# Patient Record
Sex: FEMALE | Race: WHITE | Employment: FULL TIME | URBAN - METROPOLITAN AREA
[De-identification: names, ages, dates, MRNs, and addresses within clinical notes are randomized per-mention and may not be internally consistent; named-entity substitution may affect disease eponyms.]

---
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
## 2022-02-22 ENCOUNTER — TELEPHONE (OUTPATIENT)
Dept: FAMILY MEDICINE CLINIC | Age: 22
End: 2022-02-22

## 2022-02-22 NOTE — TELEPHONE ENCOUNTER
----- Message from Louise Herman sent at 2/22/2022  3:41 PM EST -----  Subject: Message to Provider    QUESTIONS  Information for Provider? pt needs a pre opt appt, surgery is on April 7th. she has a complete physical on the 15th of march and would like to   know if that appt can be considered as a pre opt.   ---------------------------------------------------------------------------  --------------  CALL BACK INFO  What is the best way for the office to contact you? OK to leave message on   voicemail  Preferred Call Back Phone Number? [phone]  ---------------------------------------------------------------------------  --------------  SCRIPT ANSWERS  Relationship to Patient?  Self

## 2022-02-23 NOTE — TELEPHONE ENCOUNTER
We will have to change it to a pre op exam instead of a physical. Patient needs to bring any forms needed be filled out

## 2022-04-20 ENCOUNTER — HOSPITAL ENCOUNTER (EMERGENCY)
Age: 22
Discharge: HOME | End: 2022-04-20
Payer: COMMERCIAL

## 2022-04-20 VITALS
HEART RATE: 96 BPM | OXYGEN SATURATION: 98 % | TEMPERATURE: 98.4 F | BODY MASS INDEX: 25.4 KG/M2 | HEIGHT: 62 IN | DIASTOLIC BLOOD PRESSURE: 57 MMHG | SYSTOLIC BLOOD PRESSURE: 99 MMHG | WEIGHT: 138 LBS | RESPIRATION RATE: 18 BRPM

## 2022-04-20 DIAGNOSIS — J06.9 UPPER RESPIRATORY TRACT INFECTION, UNSPECIFIED TYPE: Primary | ICD-10-CM

## 2022-04-20 PROCEDURE — 99211 OFF/OP EST MAY X REQ PHY/QHP: CPT

## 2022-04-20 RX ORDER — PSEUDOEPHEDRINE HYDROCHLORIDE 30 MG/1
30 TABLET ORAL EVERY 4 HOURS PRN
Qty: 20 TABLET | Refills: 0 | Status: SHIPPED | OUTPATIENT
Start: 2022-04-20

## 2022-04-20 RX ORDER — AMOXICILLIN AND CLAVULANATE POTASSIUM 875; 125 MG/1; MG/1
1 TABLET, FILM COATED ORAL 2 TIMES DAILY
Qty: 20 TABLET | Refills: 0 | Status: SHIPPED | OUTPATIENT
Start: 2022-04-20 | End: 2022-04-30

## 2022-04-20 ASSESSMENT — PAIN SCALES - GENERAL: PAINLEVEL_OUTOF10: 0

## 2022-04-20 ASSESSMENT — PAIN - FUNCTIONAL ASSESSMENT: PAIN_FUNCTIONAL_ASSESSMENT: 0-10

## 2022-04-20 NOTE — ED PROVIDER NOTES
Department of Emergency . [facility]  Provider Note  Admit Date/RoomTime: 2022  5:21 PM  Room:   NAME: Pako Hardin  : 2000  MRN: [id number]     Chief Complaint:  Cough and Nasal Congestion    History of Present Illness       Pako Hardin is a 23 y.o. old female who presents to the urgent care center by private vehicle for valuation she is complaining of a cough and nasal congestion for 3 days. She said it is a sinus infection. She said she had this for a few months last winter took her all winter to get over it now is back again. She is denying any chest congestion or shortness of breath denying any chest pain does not have fever chills or body aches. He has been vaccinated for COVID. She is not been exposed that she knows of. ROS    Pertinent positives and negatives are stated within HPI, all other systems reviewed and are negative. Past Surgical History:   Procedure Laterality Date    BRONCHOSCOPY      TONSILLECTOMY     Social History:  reports that she has been smoking e-cigarettes. She has never used smokeless tobacco. She reports previous alcohol use. She reports previous drug use. Drug: Marijuana Zollie Axe). Family History: family history includes Arthritis in her father and mother; Asthma in her father; Heart Attack in her paternal grandfather; High Blood Pressure in her father; Osteoporosis in her father and mother; Other in her brother, father, and mother; Sleep Apnea in her father. Allergies: Adhesive tape, Food, Peanut-containing drug products, and Tree nut [macadamia nut oil]    Physical Exam            ED Triage Vitals [22 1723]   BP Temp Temp Source Pulse Resp SpO2 Height Weight   (!) 99/57 98.4 °F (36.9 °C) Infrared 96 18 98 % 5' 1.5\" (1.562 m) 138 lb (62.6 kg)      Oxygen Saturation Interpretation: Normal.    Constitutional:  Alert, development consistent with age.   Ears:  External Ears: Bilateral normal. TM's & External Canals: normal appearance, normal TMs bilaterally. Nose:   There is no discharge, swelling or lesions noted. Sinuses: no Bilateral maxillary sinus tenderness. no Bilateral frontal sinus tenderness. Mouth:  normal tongue and buccal mucosa. Neck/Lymphatics:  Neck Supple. Respiratory:     Lung sounds: normal.   CV:  Regular rate and rhythm, normal heart sounds, without pathological murmurs, ectopy, gallops, or rubs. GI:  Abdomen Soft, nontender, good bowel sounds. No firm or pulsatile mass. Integument:  Normal turgor. Warm, dry, without visible rash. Neurological:  Oriented. Motor functions intact. Lab / Imaging Results   (All laboratory and radiology results have been personally reviewed by myself)  Labs:  No results found for this visit on 04/20/22. Imaging: All Radiology results interpreted by Radiologist unless otherwise noted. No orders to display     ED Course / Medical Decision Making   Medications - No data to display       MDM:   Patient here for evaluation she had her mask off when I was in the room and I told her she had to put it on when I was in the room --she said she cannot breathe because of congestion with a mask on and I told her it did have to be on while I was in  the room. Patient rolled her eyes and said  t hat it is not COVID. She said it is a sinus infection. I did offer to test to test her for COVID she did not want tested. She has been vaccinated she does have the sinus symptoms for 3 days. She said that she had it last year and lasted for months I did treat her for sinus infection her exam is negative I did put her on Augmentin and also some Sudafed for the congestion she has been taking Zyrtec without relief she should follow-up with her doctor    1.  Upper respiratory tract infection, unspecified type      Plan   Discharge to home and advised to contact Aditi Cotter,   [address]  [phone]    Schedule an appointment as soon as possible for a visit      Patient condition is good    New Medications     New Prescriptions    AMOXICILLIN-CLAVULANATE (AUGMENTIN) 875-125 MG PER TABLET    Take 1 tablet by mouth 2 times daily for 10 days    PSEUDOEPHEDRINE (DECONGESTANT) 30 MG TABLET    Take 1 tablet by mouth every 4 hours as needed for Congestion     Electronically signed by EMETERIO Park CNP   DD: 4/20/22  **This report was transcribed using voice recognition software. Every effort was made to ensure accuracy; however, inadvertent computerized transcription errors may be present.   END OF ED PROVIDER NOTE     EMETERIO Park CNP  04/20/22 [address], EMETERIO Stewart CNP  04/20/22 [id number]

## 2024-11-20 NOTE — PROGRESS NOTES
Dr. Juany Galeas at bedside. SVE done. Reviewed strip and discussed POC. No further orders. Medication: Vyvanse  Last office visit date: 10/17/24  Medication Refill Protocol Failed.  Failed criteria: see below. Sent to clinician to review.       Controlled Substances Refill Protocol - 12 Month Protocol - ALWAYS forward to ordering provider Lvkjhe1611/19/2024 10:38 PM   Protocol Details FORWARD TO PROVIDER - Refill requests for these medications must ALWAYS be forwarded to the ordering provider, even if all criteria are met    PDMP has been reviewed in last 24 hours